# Patient Record
Sex: FEMALE | Race: WHITE | Employment: OTHER | URBAN - METROPOLITAN AREA
[De-identification: names, ages, dates, MRNs, and addresses within clinical notes are randomized per-mention and may not be internally consistent; named-entity substitution may affect disease eponyms.]

---

## 2024-03-07 ENCOUNTER — HOSPITAL ENCOUNTER (EMERGENCY)
Age: 34
Discharge: HOME | End: 2024-03-08
Attending: EMERGENCY MEDICINE
Payer: MEDICARE

## 2024-03-07 VITALS
HEIGHT: 59 IN | OXYGEN SATURATION: 97 % | HEART RATE: 90 BPM | DIASTOLIC BLOOD PRESSURE: 96 MMHG | SYSTOLIC BLOOD PRESSURE: 131 MMHG | RESPIRATION RATE: 18 BRPM | BODY MASS INDEX: 32.95 KG/M2 | TEMPERATURE: 97.7 F

## 2024-03-07 DIAGNOSIS — L02.91 ABSCESS: Primary | ICD-10-CM

## 2024-03-07 PROCEDURE — 99284 EMERGENCY DEPT VISIT MOD MDM: CPT

## 2024-03-07 RX ORDER — TRAZODONE HYDROCHLORIDE 100 MG/1
200 TABLET ORAL NIGHTLY
COMMUNITY
Start: 2024-03-01

## 2024-03-07 RX ORDER — ALPRAZOLAM 0.5 MG/1
0.5 TABLET ORAL 2 TIMES DAILY PRN
COMMUNITY
Start: 2024-03-01

## 2024-03-07 RX ORDER — CARIPRAZINE 4.5 MG/1
4.5 CAPSULE, GELATIN COATED ORAL DAILY
COMMUNITY
Start: 2024-03-01

## 2024-03-07 RX ORDER — LITHIUM CARBONATE 300 MG/1
300 CAPSULE ORAL 2 TIMES DAILY
COMMUNITY

## 2024-03-07 ASSESSMENT — LIFESTYLE VARIABLES
HOW MANY STANDARD DRINKS CONTAINING ALCOHOL DO YOU HAVE ON A TYPICAL DAY: 1 OR 2
HOW OFTEN DO YOU HAVE A DRINK CONTAINING ALCOHOL: 2-4 TIMES A MONTH

## 2024-03-07 ASSESSMENT — PAIN SCALES - GENERAL: PAINLEVEL_OUTOF10: 10

## 2024-03-07 ASSESSMENT — PAIN - FUNCTIONAL ASSESSMENT: PAIN_FUNCTIONAL_ASSESSMENT: 0-10

## 2024-03-08 PROCEDURE — 6370000000 HC RX 637 (ALT 250 FOR IP)

## 2024-03-08 PROCEDURE — 6360000002 HC RX W HCPCS

## 2024-03-08 PROCEDURE — 96372 THER/PROPH/DIAG INJ SC/IM: CPT

## 2024-03-08 RX ORDER — ACETAMINOPHEN 500 MG
1000 TABLET ORAL ONCE
Status: COMPLETED | OUTPATIENT
Start: 2024-03-08 | End: 2024-03-08

## 2024-03-08 RX ORDER — SULFAMETHOXAZOLE AND TRIMETHOPRIM 800; 160 MG/1; MG/1
1 TABLET ORAL 2 TIMES DAILY
Qty: 14 TABLET | Refills: 0 | Status: SHIPPED | OUTPATIENT
Start: 2024-03-08 | End: 2024-03-15

## 2024-03-08 RX ORDER — ONDANSETRON 4 MG/1
4 TABLET, ORALLY DISINTEGRATING ORAL 3 TIMES DAILY PRN
Qty: 21 TABLET | Refills: 0 | Status: SHIPPED | OUTPATIENT
Start: 2024-03-08

## 2024-03-08 RX ORDER — KETOROLAC TROMETHAMINE 30 MG/ML
15 INJECTION, SOLUTION INTRAMUSCULAR; INTRAVENOUS ONCE
Status: COMPLETED | OUTPATIENT
Start: 2024-03-08 | End: 2024-03-08

## 2024-03-08 RX ORDER — PROMETHAZINE HYDROCHLORIDE 25 MG/1
25 TABLET ORAL ONCE
Status: COMPLETED | OUTPATIENT
Start: 2024-03-08 | End: 2024-03-08

## 2024-03-08 RX ORDER — CEPHALEXIN 500 MG/1
500 CAPSULE ORAL 2 TIMES DAILY
Qty: 14 CAPSULE | Refills: 0 | Status: SHIPPED | OUTPATIENT
Start: 2024-03-08 | End: 2024-03-15

## 2024-03-08 RX ADMIN — PROMETHAZINE HYDROCHLORIDE 25 MG: 25 TABLET ORAL at 00:21

## 2024-03-08 RX ADMIN — KETOROLAC TROMETHAMINE 15 MG: 30 INJECTION, SOLUTION INTRAMUSCULAR; INTRAVENOUS at 00:22

## 2024-03-08 RX ADMIN — ACETAMINOPHEN 1000 MG: 500 TABLET ORAL at 00:21

## 2024-03-08 NOTE — DISCHARGE INSTRUCTIONS
You were seen in the emergency department for your left thigh abscess.  You were given prescriptions for antibiotics and nausea medication to take at home.  Please take the antibiotics as prescribed to prevent worsening infection.  You can also apply warm compresses to the wound and should do this at least several times a day.    Please return to the emergency department if you develop worsening redness of the leg or the wound becomes significantly larger or appears to be spreading, fevers, or nausea and vomiting you cannot keep any food or water down.

## 2024-03-08 NOTE — ED PROVIDER NOTES
ED Attending Attestation Note     Date of evaluation: 3/7/2024    This patient was seen by the resident.  I have seen and examined the patient, agree with the workup, evaluation, management and diagnosis. The care plan has been discussed.  My assessment reveals patient with left inner thigh pain and swelling c/w abscess that spontaneously opened and drained.  There is some surrounding erythema but no residual abscess and patient would benefit from warm compresses and a short course of antibiotics.     Seth Hager MD  03/08/24 0019

## 2024-03-08 NOTE — ED PROVIDER NOTES
systems reviewed and are negative.      Past Medical, Surgical, Family, and Social History     She has a past medical history of Anxiety.  She has a past surgical history that includes Leg Surgery and Cholecystectomy.  Her family history is not on file.  She reports that she has been smoking cigarettes. She uses smokeless tobacco. She reports current alcohol use of about 3.0 standard drinks of alcohol per week. She reports current drug use. Drug: Marijuana (Weed).    Medications     Discharge Medication List as of 3/8/2024 12:44 AM        CONTINUE these medications which have NOT CHANGED    Details   ALPRAZolam (XANAX) 0.5 MG tablet Take 1 tablet by mouth 2 times daily as needed. Max Daily Amount: 1 mgHistorical Med      VRAYLAR 4.5 MG CAPS capsule Take 1 capsule by mouth daily, DAWHistorical Med      traZODone (DESYREL) 100 MG tablet Take 2 tablets by mouth nightlyHistorical Med      lithium 300 MG capsule Take 1 capsule by mouth 2 times dailyHistorical Med             Allergies     She has No Known Allergies.    Physical Exam     INITIAL VITALS: BP: (!) 131/96, Temp: 97.7 °F (36.5 °C), Pulse: 90, Respirations: 18, SpO2: 97 %   Physical Exam  Constitutional:       General: She is not in acute distress.     Appearance: Normal appearance. She is not ill-appearing or toxic-appearing.   HENT:      Head: Normocephalic and atraumatic.   Eyes:      Extraocular Movements: Extraocular movements intact.      Pupils: Pupils are equal, round, and reactive to light.   Cardiovascular:      Rate and Rhythm: Normal rate and regular rhythm.   Pulmonary:      Effort: Pulmonary effort is normal. No respiratory distress.      Breath sounds: Normal breath sounds. No wheezing.   Abdominal:      General: Abdomen is flat.      Palpations: Abdomen is soft.      Tenderness: There is no abdominal tenderness.   Musculoskeletal:         General: Normal range of motion.      Cervical back: Normal range of motion.      Comments: Left proximal  medial thigh has a dime sized lesion of open skin with minimal underlying purulent tissue and surrounding erythema and induration that is moderately tender to palpation.  Warm to the touch.  No streaking erythema or additional wounds are noted.  No fluctuance is palpated and no drainage is able to be expressed.   Skin:     General: Skin is warm and dry.   Neurological:      General: No focal deficit present.      Mental Status: She is alert and oriented to person, place, and time.                 Mona Martínez MD  Resident  03/08/24 0150